# Patient Record
Sex: MALE | Race: WHITE | Employment: OTHER
[De-identification: names, ages, dates, MRNs, and addresses within clinical notes are randomized per-mention and may not be internally consistent; named-entity substitution may affect disease eponyms.]

---

## 2023-06-03 ENCOUNTER — APPOINTMENT (OUTPATIENT)
Facility: HOSPITAL | Age: 58
End: 2023-06-03
Payer: COMMERCIAL

## 2023-06-03 ENCOUNTER — HOSPITAL ENCOUNTER (EMERGENCY)
Facility: HOSPITAL | Age: 58
Discharge: HOME | End: 2023-06-03
Attending: STUDENT IN AN ORGANIZED HEALTH CARE EDUCATION/TRAINING PROGRAM
Payer: COMMERCIAL

## 2023-06-03 VITALS
HEART RATE: 72 BPM | SYSTOLIC BLOOD PRESSURE: 136 MMHG | DIASTOLIC BLOOD PRESSURE: 78 MMHG | OXYGEN SATURATION: 97 % | TEMPERATURE: 98 F | WEIGHT: 220 LBS | RESPIRATION RATE: 17 BRPM

## 2023-06-03 DIAGNOSIS — S42.254A CLOSED NONDISPLACED FRACTURE OF GREATER TUBEROSITY OF RIGHT HUMERUS, INITIAL ENCOUNTER: Primary | ICD-10-CM

## 2023-06-03 PROCEDURE — 6370000000 HC RX 637 (ALT 250 FOR IP): Performed by: STUDENT IN AN ORGANIZED HEALTH CARE EDUCATION/TRAINING PROGRAM

## 2023-06-03 PROCEDURE — 73030 X-RAY EXAM OF SHOULDER: CPT

## 2023-06-03 PROCEDURE — 6370000000 HC RX 637 (ALT 250 FOR IP): Performed by: EMERGENCY MEDICINE

## 2023-06-03 PROCEDURE — 99283 EMERGENCY DEPT VISIT LOW MDM: CPT

## 2023-06-03 PROCEDURE — 73080 X-RAY EXAM OF ELBOW: CPT

## 2023-06-03 RX ORDER — OXYCODONE HYDROCHLORIDE AND ACETAMINOPHEN 5; 325 MG/1; MG/1
1 TABLET ORAL
Status: COMPLETED | OUTPATIENT
Start: 2023-06-03 | End: 2023-06-03

## 2023-06-03 RX ORDER — ATORVASTATIN CALCIUM 40 MG/1
40 TABLET, FILM COATED ORAL DAILY
COMMUNITY

## 2023-06-03 RX ORDER — ONDANSETRON 4 MG/1
4 TABLET, ORALLY DISINTEGRATING ORAL ONCE
Status: COMPLETED | OUTPATIENT
Start: 2023-06-03 | End: 2023-06-03

## 2023-06-03 RX ORDER — METOPROLOL SUCCINATE 50 MG/1
50 TABLET, EXTENDED RELEASE ORAL DAILY
COMMUNITY

## 2023-06-03 RX ORDER — OXYCODONE HYDROCHLORIDE AND ACETAMINOPHEN 5; 325 MG/1; MG/1
1 TABLET ORAL EVERY 6 HOURS PRN
Qty: 12 TABLET | Refills: 0 | Status: SHIPPED | OUTPATIENT
Start: 2023-06-03 | End: 2023-06-03

## 2023-06-03 RX ORDER — IBUPROFEN 600 MG/1
600 TABLET ORAL
Status: COMPLETED | OUTPATIENT
Start: 2023-06-03 | End: 2023-06-03

## 2023-06-03 RX ORDER — ONDANSETRON 4 MG/1
4 TABLET, ORALLY DISINTEGRATING ORAL 3 TIMES DAILY PRN
Qty: 21 TABLET | Refills: 0 | Status: SHIPPED | OUTPATIENT
Start: 2023-06-03

## 2023-06-03 RX ORDER — OXYCODONE HYDROCHLORIDE AND ACETAMINOPHEN 5; 325 MG/1; MG/1
1 TABLET ORAL EVERY 6 HOURS PRN
Qty: 12 TABLET | Refills: 0 | Status: SHIPPED | OUTPATIENT
Start: 2023-06-03 | End: 2023-06-06

## 2023-06-03 RX ORDER — LOSARTAN POTASSIUM 100 MG/1
100 TABLET ORAL DAILY
COMMUNITY

## 2023-06-03 RX ORDER — FENOFIBRATE 160 MG/1
160 TABLET ORAL DAILY
COMMUNITY

## 2023-06-03 RX ORDER — ONDANSETRON 4 MG/1
4 TABLET, ORALLY DISINTEGRATING ORAL 3 TIMES DAILY PRN
Qty: 21 TABLET | Refills: 0 | Status: SHIPPED | OUTPATIENT
Start: 2023-06-03 | End: 2023-06-03

## 2023-06-03 RX ORDER — AMLODIPINE BESYLATE 10 MG/1
10 TABLET ORAL DAILY
COMMUNITY

## 2023-06-03 RX ADMIN — OXYCODONE HYDROCHLORIDE AND ACETAMINOPHEN 1 TABLET: 5; 325 TABLET ORAL at 17:31

## 2023-06-03 RX ADMIN — ONDANSETRON 4 MG: 4 TABLET, ORALLY DISINTEGRATING ORAL at 17:31

## 2023-06-03 RX ADMIN — IBUPROFEN 600 MG: 600 TABLET, FILM COATED ORAL at 15:31

## 2023-06-03 ASSESSMENT — PAIN DESCRIPTION - LOCATION: LOCATION: SHOULDER

## 2023-06-03 ASSESSMENT — PAIN DESCRIPTION - DESCRIPTORS: DESCRIPTORS: STABBING

## 2023-06-03 ASSESSMENT — ENCOUNTER SYMPTOMS: SHORTNESS OF BREATH: 0

## 2023-06-03 ASSESSMENT — PAIN DESCRIPTION - ORIENTATION: ORIENTATION: RIGHT

## 2023-06-03 ASSESSMENT — PAIN - FUNCTIONAL ASSESSMENT: PAIN_FUNCTIONAL_ASSESSMENT: 0-10

## 2023-06-03 ASSESSMENT — LIFESTYLE VARIABLES: HOW OFTEN DO YOU HAVE A DRINK CONTAINING ALCOHOL: MONTHLY OR LESS

## 2023-06-03 ASSESSMENT — PAIN SCALES - GENERAL: PAINLEVEL_OUTOF10: 8

## 2023-06-03 NOTE — ED NOTES
Pt discharged in stable condition at this time. MD and this RN reviewed discharge instructions, prescriptions, and follow up with patient at bedside. Pt verbalized understanding and denies any needs or questions at this time. Patient ambulated out of the ED with a steady gait.        Chelsie Sky RN  06/03/23 1109

## 2023-06-03 NOTE — ED TRIAGE NOTES
Patient presents to the ER with chief complaint of pain in the R shoulder radiating down to the right elbow. Patient states he was going down the stairs this morning without his glasses on and he ended up falling down approximately 2 steps. Denies numbness and tingling. +2 radial pulses. States he has limited movement to the shoulder since the injury.

## 2023-06-03 NOTE — ED PROVIDER NOTES
[facility]  EMERGENCY DEPARTMENT ENCOUNTER      Pt Name: Esha Tate  MRN: [id number]  [place] 1965  Date of evaluation: 6/3/2023  Provider: Katrin Rogers MD    CHIEF COMPLAINT       Chief Complaint   Patient presents with    Shoulder Pain         HISTORY OF PRESENT ILLNESS   24-year-old male with history of DM, HTN, HLD, CAD presents to the ED with chief complaint of right shoulder pain starting today following ground-level fall. Patient was going down the stairs without his glasses on, tripped down last 2 steps and landed on his right shoulder. No numbness or weakness. Denies losing consciousness, no headache, neck pain, back pain, chest pain, or abdominal pain. He took ibuprofen before coming to the ED without much relief. The history is provided by the patient. Review of External Medical Records:     Nursing Notes were reviewed. REVIEW OF SYSTEMS       Review of Systems   Respiratory:  Negative for shortness of breath. Cardiovascular:  Negative for chest pain. Except as noted above the remainder of the review of systems was reviewed and negative. PAST MEDICAL HISTORY     Past Medical History:   Diagnosis Date    Diabetes mellitus ([address]     H/O heart artery stent     HTN (hypertension)     Hyperlipidemia          SURGICAL HISTORY     History reviewed. No pertinent surgical history.       CURRENT MEDICATIONS       Discharge Medication List as of 6/3/2023  6:03 PM        CONTINUE these medications which have NOT CHANGED    Details   fenofibrate (TRIGLIDE) 160 MG tablet Take 1 tablet by mouth dailyHistorical Med      amLODIPine (NORVASC) 10 MG tablet Take 1 tablet by mouth dailyHistorical Med      losartan (COZAAR) 100 MG tablet Take 1 tablet by mouth dailyHistorical Med      atorvastatin (LIPITOR) 40 MG tablet Take 1 tablet by mouth dailyHistorical Med      metoprolol succinate (TOPROL XL) 50 MG extended release tablet Take 1 tablet by mouth dailyHistorical Med